# Patient Record
(demographics unavailable — no encounter records)

---

## 2025-01-17 NOTE — PHYSICAL EXAM
[Right] : right shoulder [There are no fractures, subluxations or dislocations. No significant abnormalities are seen] : There are no fractures, subluxations or dislocations. No significant abnormalities are seen [Type 2 acromion] : Type 2 acromion [No bony abnormalities] : No bony abnormalities [de-identified] : Constitutional: The general appearance of the patient is well developed, well nourished, no deformities and well groomed. Normal   Gait: Gait and function is as follows: normal gait.   Skin: Head and neck visualized skin is normal. Left upper extremity visualized skin is normal. Right upper extremity visualized skin is normal. Thoracic Skin of the thoracic spine shows visualized skin is normal.   Cardiovascular: palpable radial pulse bilaterally, good capillary refill in digits of the bilateral upper extremities and no temperature or color changes in the bilateral upper extremities.   Lymphatic: Normal Palpation of lymph nodes in the cervical.   Neurologic: fine motor control in the bilateral upper extremities is intact. Deep Tendon Reflexes in Upper and Lower Extremities Negative Boggs's in the bilateral upper extremities. The patient is oriented to time, place and person. Sensation to light touch intact in the bilateral upper extremities. Mood and Affect is normal.   Right Shoulder: Inspection of the shoulder/upper arm is as follows: Stable shoulder. Palpation of the shoulder/upper arm is as follows: There is tenderness at the AC joint, proximal biceps tendon and supraspinatus tendon. Range of motion of the shoulder is as follows: Pain with internal rotation, external rotation, abduction and forward flexion. Strength of the shoulder is as follows: Supraspinatus 4/5. External Rotation 4/5. Internal Rotation 4/5. Infraspinatus 5/5 4/5. Deltoid 5/5 Ligament Stability and Special Tests of the shoulder is as follows: Neer test is positive. Hoffmann' test is positive. Speed's test is positive.   Left Shoulder: Inspection of the shoulder/upper arm is as follows: There is a full, pain-free, stable range of motion of the shoulder with normal strength and no tenderness to palpation.   Neck: Inspection / Palpation of the cervical spine is as follows: There is a mild restricted range of motion of the cervical spine. Increase tone and mild tenderness to palpation. Stable ROM of cervical spine.   Back, including spine: Inspection / Palpation of the thoracic/lumbar spine is as follows: There is a full, pain free, stable range of motion of the thoracic spine with a normal tone and not tenderness to palpation..

## 2025-01-17 NOTE — HISTORY OF PRESENT ILLNESS
[de-identified] : 58-year-old female presenting with right shoulder pain from a traumatic fall that occurred on Monday.  She admits to a slip and fall as she was walking her dog and landed on outstretched arm.  Felt immediate lateral shoulder pain has had difficulty elevating since the injury.  Denies any prior difficulty to the shoulder before this injury.  Pain is constant, severe, 8 out of 10.  Has been taking anti-inflammatory medication and icing with no improvement.  Denies any radicular complaints, denies any numbness or tingling. Patient is RHD, no reported PMHx, works a negotiater for [employer]

## 2025-01-17 NOTE — DISCUSSION/SUMMARY
[de-identified] : 58-year-old female presenting with right shoulder pain from a traumatic fall that occurred on Monday. I personally reviewed XR which is non diagnostic  Her physical examination is consistent with full-thickness rotator cuff tear versus a nondisplaced proximal wrist fracture based on the level of pain Patient has pain with motion overhead MRI to further evaluate rotator cuff  Based on the patients history and physical exam findings, I am concerned about the possibility of a full-thickness rotator cuff tear. The patient has pain and subjective weakness consistent with this diagnosis. Therefore, I recommend an MRI to evaluate for a rotator cuff tear. This will also aid in evaluating for injury to the biceps labral complex and for any signs of impingement. The patient will follow-up after MRI to discuss further treatment options. If no full thickness tear, consider CSI  I prescribed Celebrex to be used intermittently for inflammation. We discussed the dosing schedule and potential interactions and side effects. We recommended discussing prolonged treatment of this medication with patient's primary care physician since there could be some interactions and negative effects with long-term use.  Recommended a Medrol Dosepak and discussed the dosing schedule and side effects of prednisone. We also recommend the patient contact their primary care physician to discuss any potential interactions with their current medications or health issues. Follow up 2 weeks   (1) We discussed a comprehensive treatment plans that included a prescription management plan involving the use of prescription strength medications to include Ibuprofen 600-800 mg TID, versus 500-650 mg Tylenol. We also discussed prescribing topical diclofenac (Voltaren gel) as well as once daily Meloxicam 15 mg. (2) The patient has More Than One chronic injuries/illnesses as outlined, discussed, and documented by ICD 10 codes listed, as well as the HPI and Plan section. There is a moderate risk of morbidity with further treatment, especially from use of prescription strength medications and possible side effects of these medications which include upset stomach and cardiac/renal issues with long term use were discussed. (3) I recommended that the patient follow-up with their medical physician to discuss any significant specific potential issues with long term use such as interactions with current medications or with exacerbation of underlying medical morbidities.   Attestation: I, Mindi Miles , attest that this documentation has been prepared under the direction and in the presence of Provider Anil Poole MD. The documentation recorded by the scribe, in my presence, accurately reflects the service I personally performed, and the decisions made by me with my edits as appropriate. Anil Poole MD

## 2025-01-31 NOTE — PHYSICAL EXAM
[Right] : right shoulder [There are no fractures, subluxations or dislocations. No significant abnormalities are seen] : There are no fractures, subluxations or dislocations. No significant abnormalities are seen [Type 2 acromion] : Type 2 acromion [No bony abnormalities] : No bony abnormalities [de-identified] : Constitutional: The general appearance of the patient is well developed, well nourished, no deformities and well groomed. Normal   Gait: Gait and function is as follows: normal gait.   Skin: Head and neck visualized skin is normal. Left upper extremity visualized skin is normal. Right upper extremity visualized skin is normal. Thoracic Skin of the thoracic spine shows visualized skin is normal.   Cardiovascular: palpable radial pulse bilaterally, good capillary refill in digits of the bilateral upper extremities and no temperature or color changes in the bilateral upper extremities.   Lymphatic: Normal Palpation of lymph nodes in the cervical.   Neurologic: fine motor control in the bilateral upper extremities is intact. Deep Tendon Reflexes in Upper and Lower Extremities Negative Boggs's in the bilateral upper extremities. The patient is oriented to time, place and person. Sensation to light touch intact in the bilateral upper extremities. Mood and Affect is normal.   Right Shoulder: Inspection of the shoulder/upper arm is as follows: Stable shoulder. Palpation of the shoulder/upper arm is as follows: There is tenderness at the AC joint, proximal biceps tendon and supraspinatus tendon. Range of motion of the shoulder is as follows: Pain with internal rotation, external rotation, abduction and forward flexion. Strength of the shoulder is as follows: Supraspinatus 4/5. External Rotation 4/5. Internal Rotation 4/5. Infraspinatus 5/5 4/5. Deltoid 5/5 Ligament Stability and Special Tests of the shoulder is as follows: Neer test is positive. Hoffmann' test is positive. Speed's test is positive.   Left Shoulder: Inspection of the shoulder/upper arm is as follows: There is a full, pain-free, stable range of motion of the shoulder with normal strength and no tenderness to palpation.   Neck: Inspection / Palpation of the cervical spine is as follows: There is a mild restricted range of motion of the cervical spine. Increase tone and mild tenderness to palpation. Stable ROM of cervical spine.   Back, including spine: Inspection / Palpation of the thoracic/lumbar spine is as follows: There is a full, pain free, stable range of motion of the thoracic spine with a normal tone and not tenderness to palpation..

## 2025-01-31 NOTE — DISCUSSION/SUMMARY
[de-identified] : 58-year-old female presenting with right shoulder pain from a traumatic fall that occurred on Monday. I personally reviewed XR which is non diagnostic  Patient has pain with motion overhead -and is diagnosed with nondisplaced proximal humerus fracture  I personally reviewed the imaging and the MRI report with the patient which demonstrate edema in the area of the greater tuberosity consistent with nondisplaced impaction type fracture.  The rotator cuff is intact with no significant rotator cuff pathology. I personally reviewed MRI which demonstrates non displaced microfracture   Pt reports the Celebrex bothered her stomach If patient doesn't make significant improvement, would consider a formal course of PT    (1) We discussed a comprehensive treatment plans that included a prescription management plan involving the use of prescription strength medications to include Ibuprofen 600-800 mg TID, versus 500-650 mg Tylenol. We also discussed prescribing topical diclofenac (Voltaren gel) as well as once daily Meloxicam 15 mg. (2) The patient has More Than One chronic injuries/illnesses as outlined, discussed, and documented by ICD 10 codes listed, as well as the HPI and Plan section. There is a moderate risk of morbidity with further treatment, especially from use of prescription strength medications and possible side effects of these medications which include upset stomach and cardiac/renal issues with long term use were discussed. (3) I recommended that the patient follow-up with their medical physician to discuss any significant specific potential issues with long term use such as interactions with current medications or with exacerbation of underlying medical morbidities.   Attestation: I, Mindi Miles , attest that this documentation has been prepared under the direction and in the presence of Provider Anil Poole MD. The documentation recorded by the scribe, in my presence, accurately reflects the service I personally performed, and the decisions made by me with my edits as appropriate. Anil Poole MD

## 2025-01-31 NOTE — HISTORY OF PRESENT ILLNESS
[de-identified] : 58-year-old female presenting with right shoulder pain from a traumatic fall that occurred on Monday.  She admits to a slip and fall as she was walking her dog and landed on outstretched arm.  Felt immediate lateral shoulder pain has had difficulty elevating since the injury.  Denies any prior difficulty to the shoulder before this injury.  Pain is constant, severe, 8 out of 10.  Has been taking anti-inflammatory medication and icing with no improvement.  Denies any radicular complaints, denies any numbness or tingling. 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 Patient here to review mri results done at [facility] demonstrating non displaced microfracture. Pt is 3 weeks out from initial injury. Pt reports pain is still present in the shoulder, but her motion overhead has improved

## 2025-01-31 NOTE — DATA REVIEWED
[FreeTextEntry1] : MRI right shoulder 1/21/25 [facility] 1. Bone marrow edema like signal in the lateral humeral head and greater tuberosity consistent with at least bone contusion. Probable trabecular microfracture in and at the base of the greater tuberosity. Clinical correlation recommended. 2. Shallow partial undersurface tears involving anterior insertional fibers of the supraspinatus tendon. Mild bursal fraying of posterior insertional fibers. 3. Linear intrasubstance appearing tear within insertional fibers of the subscapularis tendon. No full-thickness or retractile rotator cuff tear. ----- Page Break ----- 4. Probable nondisplaced tear in the posterior glenoid labrum, which is not optimally evaluated.

## 2025-04-21 NOTE — PHYSICAL EXAM
[de-identified] : Constitutional: The general appearance of the patient is well developed, well nourished, no deformities and well groomed. Normal   Gait: Gait and function is as follows: normal gait.   Skin: Head and neck visualized skin is normal. Left upper extremity visualized skin is normal. Right upper extremity visualized skin is normal. Thoracic Skin of the thoracic spine shows visualized skin is normal.   Cardiovascular: palpable radial pulse bilaterally, good capillary refill in digits of the bilateral upper extremities and no temperature or color changes in the bilateral upper extremities.   Lymphatic: Normal Palpation of lymph nodes in the cervical.   Neurologic: fine motor control in the bilateral upper extremities is intact. Deep Tendon Reflexes in Upper and Lower Extremities Negative Boggs's in the bilateral upper extremities. The patient is oriented to time, place and person. Sensation to light touch intact in the bilateral upper extremities. Mood and Affect is normal.   Right Shoulder: Inspection of the shoulder/upper arm is as follows: Stable shoulder. Palpation of the shoulder/upper arm is as follows: There is tenderness at the AC joint, proximal biceps tendon and supraspinatus tendon. Range of motion of the shoulder is as follows: Pain with internal rotation, external rotation, abduction and forward flexion. Strength of the shoulder is as follows: Supraspinatus 4/5. External Rotation 4/5. Internal Rotation 4/5. Infraspinatus 5/5 4/5. Deltoid 5/5 Ligament Stability and Special Tests of the shoulder is as follows: Neer test is positive. Hoffmann' test is positive. Speed's test is positive.   Left Shoulder: Inspection of the shoulder/upper arm is as follows: There is a full, pain-free, stable range of motion of the shoulder with normal strength and no tenderness to palpation.   Neck: Inspection / Palpation of the cervical spine is as follows: There is a mild restricted range of motion of the cervical spine. Increase tone and mild tenderness to palpation. Stable ROM of cervical spine.   Back, including spine: Inspection / Palpation of the thoracic/lumbar spine is as follows: There is a full, pain free, stable range of motion of the thoracic spine with a normal tone and not tenderness to palpation..

## 2025-04-21 NOTE — HISTORY OF PRESENT ILLNESS
[de-identified] : 58-year-old female presenting with right shoulder pain from a traumatic fall that occurred on Monday.  She admits to a slip and fall as she was walking her dog and landed on outstretched arm.  Felt immediate lateral shoulder pain has had difficulty elevating since the injury.  Denies any prior difficulty to the shoulder before this injury.  Pain is constant, severe, 8 out of 10.  Has been taking anti-inflammatory medication and icing with no improvement.  Denies any radicular complaints, denies any numbness or tingling. 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 Patient here to review mri results done at [facility] demonstrating non displaced microfracture. Pt is 3 weeks out from initial injury. Pt reports pain is still present in the shoulder, but her motion overhead has improved   4/21/25: Patient presents today for right shoulder pain and a non displaced microfracture. Pt reports pain and ROM have mildly improved.

## 2025-04-21 NOTE — PROCEDURE
[FreeTextEntry3] : Large Joint Injection was performed because of pain and inflammation. Anesthesia: ethyl chloride sprayed topically.. Depomedrol: An injection of Kenalog 40 mg , 1 cc. Lidocaine: 7 cc.   Medication was injected in the right subacromial space. Patient has tried OTC's including aspirin, Ibuprofen, Aleve etc or prescription NSAIDS, and/or exercises at home and/ or physical therapy without satisfactory response and Patient has decreased mobility in the joint. After verbal consent using sterile preparation and technique. The risks, benefits, and alternatives to cortisone injection were explained in full to the patient. Risks outlined include but are not limited to infection, sepsis, bleeding, scarring, skin discoloration, temporary increase in pain, syncopal episode, failure to resolve symptoms, allergic reaction, symptom recurrence, and elevation of blood sugar in diabetics. Patient understood the risks. All questions were answered. After discussion of options, patient requested an injection. Oral informed consent was obtained and sterile prep was done of the injection site. Sterile technique was utilized for the procedure including the preparation of the solutions used for the injection. Patient tolerated the procedure well. Advised to ice the injection site this evening. Prep with alcohol locally to site. Sterile technique used. Patient tolerated procedure well. Post Procedure Instructions: Patient was advised to call if redness, pain, or fever occur and apply ice for 15 min. out of every hour for the next 12-24 hours as tolerated. patient was advised to rest the joint(s) for 7 days.